# Patient Record
Sex: FEMALE | Race: WHITE
[De-identification: names, ages, dates, MRNs, and addresses within clinical notes are randomized per-mention and may not be internally consistent; named-entity substitution may affect disease eponyms.]

---

## 2018-02-02 ENCOUNTER — HOSPITAL ENCOUNTER (EMERGENCY)
Dept: HOSPITAL 17 - PHED | Age: 73
Discharge: HOME | End: 2018-02-02
Payer: COMMERCIAL

## 2018-02-02 VITALS
SYSTOLIC BLOOD PRESSURE: 128 MMHG | OXYGEN SATURATION: 95 % | TEMPERATURE: 98.7 F | RESPIRATION RATE: 16 BRPM | HEART RATE: 92 BPM | DIASTOLIC BLOOD PRESSURE: 68 MMHG

## 2018-02-02 VITALS — BODY MASS INDEX: 35.22 KG/M2 | WEIGHT: 219.14 LBS | HEIGHT: 66 IN

## 2018-02-02 DIAGNOSIS — R94.31: ICD-10-CM

## 2018-02-02 DIAGNOSIS — J10.1: Primary | ICD-10-CM

## 2018-02-02 DIAGNOSIS — E78.00: ICD-10-CM

## 2018-02-02 LAB
ALBUMIN SERPL-MCNC: 3.7 GM/DL (ref 3.4–5)
ALP SERPL-CCNC: 93 U/L (ref 45–117)
ALT SERPL-CCNC: 24 U/L (ref 10–53)
AST SERPL-CCNC: 20 U/L (ref 15–37)
BASOPHILS # BLD AUTO: 0.1 TH/MM3 (ref 0–0.2)
BASOPHILS NFR BLD: 1.4 % (ref 0–2)
BILIRUB SERPL-MCNC: 0.3 MG/DL (ref 0.2–1)
BUN SERPL-MCNC: 13 MG/DL (ref 7–18)
CALCIUM SERPL-MCNC: 8.9 MG/DL (ref 8.5–10.1)
CHLORIDE SERPL-SCNC: 105 MEQ/L (ref 98–107)
CREAT SERPL-MCNC: 0.69 MG/DL (ref 0.5–1)
EOSINOPHIL # BLD: 0.1 TH/MM3 (ref 0–0.4)
EOSINOPHIL NFR BLD: 1.8 % (ref 0–4)
ERYTHROCYTE [DISTWIDTH] IN BLOOD BY AUTOMATED COUNT: 14.6 % (ref 11.6–17.2)
GFR SERPLBLD BASED ON 1.73 SQ M-ARVRAT: 84 ML/MIN (ref 89–?)
GLUCOSE SERPL-MCNC: 121 MG/DL (ref 74–106)
HCO3 BLD-SCNC: 27.7 MEQ/L (ref 21–32)
HCT VFR BLD CALC: 40 % (ref 35–46)
HGB BLD-MCNC: 13.3 GM/DL (ref 11.6–15.3)
LYMPHOCYTES # BLD AUTO: 0.6 TH/MM3 (ref 1–4.8)
LYMPHOCYTES NFR BLD AUTO: 14.4 % (ref 9–44)
MCH RBC QN AUTO: 28.3 PG (ref 27–34)
MCHC RBC AUTO-ENTMCNC: 33.2 % (ref 32–36)
MCV RBC AUTO: 85.2 FL (ref 80–100)
MONOCYTE #: 0.4 TH/MM3 (ref 0–0.9)
MONOCYTES NFR BLD: 9.2 % (ref 0–8)
NEUTROPHILS # BLD AUTO: 3.2 TH/MM3 (ref 1.8–7.7)
NEUTROPHILS NFR BLD AUTO: 73.2 % (ref 16–70)
PLATELET # BLD: 249 TH/MM3 (ref 150–450)
PMV BLD AUTO: 7 FL (ref 7–11)
PROT SERPL-MCNC: 7.8 GM/DL (ref 6.4–8.2)
RBC # BLD AUTO: 4.7 MIL/MM3 (ref 4–5.3)
SODIUM SERPL-SCNC: 138 MEQ/L (ref 136–145)
TROPONIN I SERPL-MCNC: (no result) NG/ML (ref 0.02–0.05)
WBC # BLD AUTO: 4.4 TH/MM3 (ref 4–11)

## 2018-02-02 PROCEDURE — 84484 ASSAY OF TROPONIN QUANT: CPT

## 2018-02-02 PROCEDURE — 99285 EMERGENCY DEPT VISIT HI MDM: CPT

## 2018-02-02 PROCEDURE — 83690 ASSAY OF LIPASE: CPT

## 2018-02-02 PROCEDURE — 87804 INFLUENZA ASSAY W/OPTIC: CPT

## 2018-02-02 PROCEDURE — 82550 ASSAY OF CK (CPK): CPT

## 2018-02-02 PROCEDURE — 71045 X-RAY EXAM CHEST 1 VIEW: CPT

## 2018-02-02 PROCEDURE — 80053 COMPREHEN METABOLIC PANEL: CPT

## 2018-02-02 PROCEDURE — 85025 COMPLETE CBC W/AUTO DIFF WBC: CPT

## 2018-02-02 PROCEDURE — 93005 ELECTROCARDIOGRAM TRACING: CPT

## 2018-02-02 NOTE — PD
HPI


Chief Complaint:  Cold / Flu Symptoms


Time Seen by Provider:  17:22


Travel History


International Travel<30 days:  No


Contact w/Intl Traveler<30days:  No


Traveled to known affect area:  No





History of Present Illness


HPI


72-year-old female that presents to the ED for evaluation of cold-like 

symptoms.  Per patient she's had cold like symptoms for 3 days and has sick 

contacts including daughter.  Per patient she was seen by her doctor today and 

she was sent here for evaluation.  Patient also states that she's been having 

back cough that causes pain on her thoracic back and goes to her front.  She's 

never had that before.  She denies abdominal pain.  No nausea or vomiting.  

Pain is more when she coughs.  Cough is productive.  Denies any chest pain 

otherwise.  Denies any recent travel.  Denies taking any blood thinners.  No 

allergies to medication.  No urinary or bowel movement issues.  Patient denies 

sustaining the flu shot this year.  Patient states that she has body aches and 

fevers as well.  She's been taking OTC meds with some relief of the symptoms 

continue.  Pain per patient is 4 out of 10.





PFSH


Past Medical History


Hx Anticoagulant Therapy:  No


Blood Disorders:  No


Depression:  Yes


Cancer:  Yes (ENDOMETRIAL)


Cardiovascular Problems:  Yes (CHOL)


High Cholesterol:  Yes


Diabetes:  No


Glaucoma:  No


Hepatitis:  No


Hypertension:  No


Immune Disorder:  No


Implanted Vascular Access Dvce:  No


Musculoskeletal:  Yes


Neurologic:  No


Psychiatric:  No


Reproductive:  Yes (ENOMETRIAL CA)


Respiratory:  No


Thyroid Disease:  No


Pregnant?:  Not Pregnant


Menopausal:  Yes





Past Surgical History


Abdominal Surgery:  No


AICD:  No


Arteriovenous Shunt:  No


Cardiac Surgery:  No


 Section:  Yes (X 2)


Ear Surgery:  No


Endocrine Surgery:  No


Eye Surgery:  No


Genitourinary Surgery:  No


Gynecologic Surgery:  Yes


Insulin Pump:  No


Joint Replacement:  No


Neurologic Surgery:  No


Oral Surgery:  Yes (TONSILLECTOMY)


Pacemaker:  No


Thoracic Surgery:  No


Tonsillectomy:  Yes


Other Surgery:  Yes (RIGHT HAND)





Social History


Alcohol Use:  No


Tobacco Use:  No


Substance Use:  No





Allergies-Medications


(Allergen,Severity, Reaction):  


Coded Allergies:  


     No Known Allergies (Verified  Adverse Reaction, Unknown, 18)


Reported Meds & Prescriptions





Reported Meds & Active Scripts


Active


Tamiflu (Oseltamivir Phosphate) 75 Mg Cap 75 Mg PO BID 5 Days


Reported


Lorazepam 2 Mg Tab 2 Mg PO Q8H PRN


Paroxetine (Paroxetine HCl) 10 Mg Tab 10 Mg PO DAILY


Lovastatin 20 Mg Tab 20 Mg PO DAILY


Omeprazole 20 Mg Tab 20 Mg PO DAILY


Ditropan (Oxybutynin Chloride) 5 Mg Tab 5 Mg PO Q12HR








Review of Systems


Except as stated in HPI:  all other systems reviewed are Neg





Physical Exam


Narrative


GENERAL: Well-nourished, well-developed patient in no apparent distress.


SKIN: Warm and dry.


HEAD: Atraumatic. Normocephalic. 


EYES: Pupils equal and round reactive to light and accommodation.  No scleral 

icterus. No injection or drainage.  


ENT: No nasal bleeding or discharge.  Mucous membranes pink and moist.  TMs are 

clear with no sign of infection or perforation.  No mastoid tenderness.  Ear 

canals are intact bilaterally.  No lymphadenopathy.  Nostril mucosa is red and 

moist with clear mucus noted.  No sinus tenderness to palpation noted.  Tonsils 

are not enlarged or swollen. No ulvua Deviation.  Tongue is midline.


NECK: Trachea midline. No JVD.  No meningeal signs noted


CARDIOVASCULAR: Regular rate and rhythm.  


RESPIRATORY: No accessory muscle use. Clear to auscultation. Breath sounds 

equal bilaterally. 


GASTROINTESTINAL: Abdomen soft, non-tender, nondistended. Hepatic and splenic 

margins not palpable. 


MUSCULOSKELETAL: Extremities without clubbing, cyanosis, or edema. No obvious 

deformities. 


NEUROLOGICAL: Awake and alert. No obvious cranial nerve deficits.  Motor 

grossly within normal limits. Five out of 5 muscle strength in the arms and 

legs.  Normal speech.


PSYCHIATRIC: Appropriate mood and affect; insight and judgment normal.





Data


Data


Last Documented VS





Vital Signs








  Date Time  Temp Pulse Resp B/P (MAP) Pulse Ox O2 Delivery O2 Flow Rate FiO2


 


18 16:22 98.7 92 16 128/68 (88) 95   








Orders





 Orders


Electrocardiogram (18 17:33)


Complete Blood Count With Diff (18 17:33)


Comprehensive Metabolic Panel (18 17:33)


Ckmb (Isoenzyme) Profile (18 17:33)


Troponin I (18 17:33)


Lipase (18 17:33)


Influenzae A/B Antigen (18 17:33)


Chest, Single Ap (18 17:33)


Iv Access Insert/Monitor (18 17:33)


Ecg Monitoring (18 17:33)


Ed Discharge Order (18 18:25)





Labs





Laboratory Tests








Test


  18


17:50


 


White Blood Count 4.4 TH/MM3 


 


Red Blood Count 4.70 MIL/MM3 


 


Hemoglobin 13.3 GM/DL 


 


Hematocrit 40.0 % 


 


Mean Corpuscular Volume 85.2 FL 


 


Mean Corpuscular Hemoglobin 28.3 PG 


 


Mean Corpuscular Hemoglobin


Concent 33.2 % 


 


 


Red Cell Distribution Width 14.6 % 


 


Platelet Count 249 TH/MM3 


 


Mean Platelet Volume 7.0 FL 


 


Neutrophils (%) (Auto) 73.2 % 


 


Lymphocytes (%) (Auto) 14.4 % 


 


Monocytes (%) (Auto) 9.2 % 


 


Eosinophils (%) (Auto) 1.8 % 


 


Basophils (%) (Auto) 1.4 % 


 


Neutrophils # (Auto) 3.2 TH/MM3 


 


Lymphocytes # (Auto) 0.6 TH/MM3 


 


Monocytes # (Auto) 0.4 TH/MM3 


 


Eosinophils # (Auto) 0.1 TH/MM3 


 


Basophils # (Auto) 0.1 TH/MM3 


 


CBC Comment DIFF FINAL 


 


Differential Comment  


 


Blood Urea Nitrogen 13 MG/DL 


 


Creatinine 0.69 MG/DL 


 


Random Glucose 121 MG/DL 


 


Total Protein 7.8 GM/DL 


 


Albumin 3.7 GM/DL 


 


Calcium Level 8.9 MG/DL 


 


Alkaline Phosphatase 93 U/L 


 


Aspartate Amino Transf


(AST/SGOT) 20 U/L 


 


 


Alanine Aminotransferase


(ALT/SGPT) 24 U/L 


 


 


Total Bilirubin 0.3 MG/DL 


 


Sodium Level 138 MEQ/L 


 


Potassium Level 4.3 MEQ/L 


 


Chloride Level 105 MEQ/L 


 


Carbon Dioxide Level 27.7 MEQ/L 


 


Anion Gap 5 MEQ/L 


 


Estimat Glomerular Filtration


Rate 84 ML/MIN 


 


 


Total Creatine Kinase 77 U/L 


 


Troponin I


  LESS THAN 0.02


NG/ML


 


Lipase 125 U/L 











MDM


Medical Decision Making


Medical Screen Exam Complete:  Yes


Emergency Medical Condition:  Yes


Medical Record Reviewed:  Yes


Interpretation(s)


CBC & BMP Diagram


18 17:50








Total Protein 7.8, Albumin 3.7, Calcium Level 8.9, Alkaline Phosphatase 93, 

Aspartate Amino Transf (AST/SGOT) 20, Alanine Aminotransferase (ALT/SGPT) 24, 

Total Bilirubin 0.3





lipase WNL





Last Impressions








Chest X-Ray [id number] Signed





Impressions: 





 Service Date/Time:  2018 17:50 - CONCLUSION:  1. Mild 





 cardiomegaly.  2. No focal infiltrate or pulmonary vascular congestion     





 Tarun Santana MD 





troponin and CKMB negative 


EKG shows sinus rhythm with no sign of acute ischemia or arrhythmia read by me 

and attending.


Influenza positive for influenza B


Differential Diagnosis


Influenza versus pneumonia versus a typical chest pain versus URI versus ACS 

less likely but still in differential


Narrative Course


72-year-old female that presents to the ED for evaluation of cold like 

symptoms.  Patient was properly examined and was found to have signs and 

symptoms very consistent appears to be cold-like symptoms.  Patient does 

complain of some back pain that radiates to the chest.  She doesn't have any 

abdominal pain.  Pain is more with cough.  She doesn't first have risk factors 

for ACS.  This appears to be less likely.  I do recommend labs and imaging at 

least check a troponin to see if anything has short of breath the symptoms with 

the chest pain started today.  Labs and imaging were essentially unremarkable.  

She became positive for the influence and this is likely the source of all the 

symptoms.  I discussed this with my attending who agrees with discharge.  

Patient will be given prescription for Tamiflu.  Told to follow up closely with 

PCP.  Take Motrin or Tylenol for pain and fever.  Drink plenty of fluids.  See 

ED worsening symptoms.  OTC meds as needed.





Diagnosis





 Primary Impression:  


 Influenza B


Patient Instructions:  General Instructions





***Additional Instructions:  


Motrin and Tylenol for pain and fever.


You can use over-the-counter antihistamine as well as well as Mucinex as needed 

for runny nose and congestion.


Cough drops for cough as needed.


Drink plenty of fluids.


Follow-up with PCP.


See ED for worsening symptoms.


***Med/Other Pt SpecificInfo:  Prescription(s) given


Scripts


Oseltamivir (Tamiflu) 75 Mg Cap


75 MG PO BID for Mgmt Viral Infection for 5 Days, #10 CAP 0 Refills


   Prov: Kia Mccoy MD         18


Disposition:  01 DISCHARGE HOME


Condition:  Stable











Blair Alcaraz 2018 18:31

## 2018-02-02 NOTE — RADRPT
EXAM DATE/TIME:  02/02/2018 17:50 

 

[facility] COMPARISON:     

No previous studies available for comparison.

 

                     

INDICATIONS :     

Chest pain and congestion. 

                     

 

MEDICAL HISTORY :            

Uterine cancer   

 

SURGICAL HISTORY :     

None.   

 

ENCOUNTER:     

Initial                                        

 

ACUITY:     

3 days      

 

PAIN SCORE:     

5/10

 

LOCATION:     

Bilateral chest 

 

FINDINGS:     

Tiny scattered calcified granulomas are noted bilaterally. There is no focal infiltrate or pulmonary 
vascular congestion. The heart is mildly enlarged. Degenerative changes are noted throughout the thor
acic spine.

 

CONCLUSION:     

1. Mild cardiomegaly. 

2. No focal infiltrate or pulmonary vascular congestion

 

 

 

 Tarun Santana MD on February 02, 2018 at 18:02           

Board Certified Radiologist.

 This report was verified electronically.

## 2018-02-03 NOTE — EKG
Date Performed: 02/02/2018       Time Performed: 18:04:24

 

PTAGE:      72 years

 

EKG:      Sinus rhythm 

 

 MARKED LEFT AXIS DEVIATION INCOMPLETE RIGHT BUNDLE BRANCH BLOCK Since previous tracing, no significa
nt change noted ABNORMAL ECG

 

PREVIOUS TRACING       : 12/01/2008 07.50

 

DOCTOR:   Mono Swann  Interpretating Date/Time  02/03/2018 14:03:04